# Patient Record
Sex: FEMALE | Race: BLACK OR AFRICAN AMERICAN | Employment: OTHER | URBAN - METROPOLITAN AREA
[De-identification: names, ages, dates, MRNs, and addresses within clinical notes are randomized per-mention and may not be internally consistent; named-entity substitution may affect disease eponyms.]

---

## 2022-08-03 ENCOUNTER — OFFICE VISIT (OUTPATIENT)
Dept: VASCULAR SURGERY | Age: 67
End: 2022-08-03
Payer: COMMERCIAL

## 2022-08-03 VITALS
HEIGHT: 65 IN | OXYGEN SATURATION: 97 % | HEART RATE: 73 BPM | WEIGHT: 178 LBS | BODY MASS INDEX: 29.66 KG/M2 | DIASTOLIC BLOOD PRESSURE: 74 MMHG | SYSTOLIC BLOOD PRESSURE: 122 MMHG

## 2022-08-03 DIAGNOSIS — I65.02 STENOSIS OF LEFT VERTEBRAL ARTERY: Primary | ICD-10-CM

## 2022-08-03 PROCEDURE — 99204 OFFICE O/P NEW MOD 45 MIN: CPT | Performed by: STUDENT IN AN ORGANIZED HEALTH CARE EDUCATION/TRAINING PROGRAM

## 2022-08-03 PROCEDURE — 1123F ACP DISCUSS/DSCN MKR DOCD: CPT | Performed by: STUDENT IN AN ORGANIZED HEALTH CARE EDUCATION/TRAINING PROGRAM

## 2022-08-03 RX ORDER — MELATONIN
1000 DAILY
COMMUNITY

## 2022-08-03 RX ORDER — VALSARTAN 80 MG/1
TABLET ORAL
COMMUNITY
Start: 2022-07-06

## 2022-08-03 NOTE — PROGRESS NOTES
1. Have you been to the ER, urgent care clinic since your last visit? Yes/ [facility] /July       Hospitalized since your last visit? Yes  3 day stay      2. Have you seen or consulted any other health care providers outside of the [address] [place] [address] since your last visit? Include any pap smears or colon screening.   No

## 2022-08-12 NOTE — PROGRESS NOTES
08/12/22        Mariposa Jeter        History and Physical    Mariposa Jeter is a 77 y.o. female referred for possible vertebral artery stenosis vs occlusion seen on MRA during a recent admission for acute CVA. Patient has medical history significant for HTN, GERD, active smoker. During the work up, MRI was reviewed which showed no acute stroke, micro hemorrhages in right lateral cerebrum  with possible amyloid angiopathy. Patient describes symptoms of tinglins and pins and needles across the face and numbness across the jaw. Patient denies any localized sensorimotor changes. MRA showed no stenosis in the carotids with possible LVA stenosis. Physical Exam:    Visit Vitals  /74 (BP 1 Location: Right arm, BP Patient Position: Sitting)   Pulse 73   Ht 5' 5\" (1.651 m)   Wt 178 lb (80.7 kg)   SpO2 97%   BMI 29.62 kg/m²      HEENT-PERRLA exactly movements intact, no scleral icterus, mucous membranes pink and moist  Neck-no JVD, no carotid bruits  Heart-regular rate and rhythm no murmurs, rubs or gallops  Chest-clear to auscultation bilaterally no wheezes, rhonchi or rubs  Abdomen-soft, nontender, no palpable organomegaly or masses, no palpable pulsatile masses  Extremities-no clubbing, cyanosis or edema. No wounds or gangrenous changes to the toes or feet. Skin is intact. Feet are pink warm and well-perfused bilaterally  Pulses-carotid, radial, brachial, femoral 2+ bilaterally. Bilateral doppler signals dorsalis pedis, posterior tibial       History reviewed. No pertinent past medical history. History reviewed. No pertinent surgical history. There is no problem list on file for this patient. Current Outpatient Medications   Medication Sig Dispense Refill    cholecalciferol (VITAMIN D3) (1000 Units /25 mcg) tablet Take 1,000 Units by mouth in the morning.       valsartan (DIOVAN) 80 mg tablet 1 tablet       No Known Allergies  Social History     Socioeconomic History    Marital status:  Spouse name: Not on file    Number of children: Not on file    Years of education: Not on file    Highest education level: Not on file   Occupational History    Not on file   Tobacco Use    Smoking status: Never    Smokeless tobacco: Never   Substance and Sexual Activity    Alcohol use: Never    Drug use: Never    Sexual activity: Not on file   Other Topics Concern    Not on file   Social History Narrative    Not on file     Social Determinants of Health     Financial Resource Strain: Not on file   Food Insecurity: Not on file   Transportation Needs: Not on file   Physical Activity: Not on file   Stress: Not on file   Social Connections: Not on file   Intimate Partner Violence: Not on file   Housing Stability: Not on file     Family History   Problem Relation Age of Onset    Diabetes Mother     Diabetes Sister        Impression and Plan:  Mariposa Jeter is a 77 y.o. female with incidental findings of left vertebral artery stenosis    - No evidence of symptoms from the vertebral stenosis  - Recommend carotid duplex  - RTC post study    We reviewed the plan with the patient and the patient understands.         Myra Howell MD

## 2022-09-12 DIAGNOSIS — I65.02 STENOSIS OF LEFT VERTEBRAL ARTERY: ICD-10-CM

## 2023-02-02 ENCOUNTER — DOCUMENTATION ONLY (OUTPATIENT)
Dept: VASCULAR SURGERY | Age: 68
End: 2023-02-02

## 2023-02-02 NOTE — PROGRESS NOTES
Patient was scheduled for her carotid study and follow up on 02/03/2023. She called to inform us that she's not having any new issues and doesn't feel like she needs any further vascular follow ups for now.